# Patient Record
(demographics unavailable — no encounter records)

---

## 2025-06-23 NOTE — DISCUSSION/SUMMARY
[de-identified] : Recent lumbar MRI does not support idea well of LT anterior thigh pain, stenosis is mild at L5-S1, which could be a contributor to her back pains. Bone marrow enhancement from 2020 study appears resolved.  Seen by vascular physician, doppler results pending. Recommended following up with [facility] neurologist in regard to her LT anterior thigh pains.    Moving forward I'd like to see as needed.  Prior to appointment and during encounter with patient extensive medical records were reviewed including but not limited to, hospital records, out patient records, imaging results, and lab data. During this appointment the patient was examined, diagnoses were discussed and explained in a face to face manner. In addition extensive time was spent reviewing aforementioned diagnostic studies. Counseling including abnormal image results, differential diagnoses, treatment options, risk and benefits, lifestyle changes, current condition, and current medications was performed. Patient's comments, questions, and concerns were address and patient verbalized understanding. Based on this patient's presentation at our office, which is an orthopedic spine surgeon's office, this patient inherently / intrinsically has a risk, however minute, of developing issues such as Cauda equina syndrome, bowel and bladder changes, or progression of motor or neurological deficits such as paralysis which may be permanent.   I, Sue Davison, attest that this documentation has been prepared under the direction and in the presence of provider Brody Levi MD.

## 2025-06-23 NOTE — DATA REVIEWED
[FreeTextEntry1] : On my interpretation of these images from [facility] on 06/19/2025.  Report from prior MRI at [facility] 08/20/2020, superior L3, L2-L3 vertebral bodies, possible hemangiomas. Final reading not yet present. Axial slices from L4-S1 not present at the moment. Both L2 and L3 bones do not demonstrate any lesions, no enhancement with addition of contrast.  L5-S1: adv DDD, mild FS BL.  L4-5: mild DDD, disc bulge.  L3-4: mild DDD, disc bulge.  L2-3: nl L1-2: nl T12-L1: nl  On my interpretation of these images. [facility] lumbar MRI- Anterior L2 & posterior portion of L3 small enhancement of bone marrow now resolved on current studies.   Report ONLY on 4/21/25 from [facility]-  Lumbar XR- adv DDD L5-S1, multilevel facet degeneration in other levels & mild SIJ degeneration.  Hip & Pelvis XR- no FXs, mild BL hip OA.  8/20/2020 Lumbar MRI- T11 schmorl's node, degeneration L5-S1 w/ modic 2 changes,  L2 & L3 lesion likely represents atypical hemangioma.

## 2025-06-23 NOTE — HISTORY OF PRESENT ILLNESS
[Lower back] : lower back [de-identified] : 06/23/2025: Patient presenting today for an MRI results review. C/o constant leg pains. L anterior thigh pains w/ burning sensation.    06/16/2025:  Patient presents to the office today for initial evaluation of left thigh pain and numbness. Patient reports 1.5 week history of spontaneous onset of left thigh Burning pain and numbness. No recent trauma injury. She does have a distant history of lumbar spine fracture approximately 45 years ago treated conservatively with a brace. She has had a baseline level of lower back pain and achiness in the legs for many years. She has seen Dr. Sue at [facility] for routine trigger point injections. Last trigger point injection February 2025. Symptoms are predominantly isolated to the left thigh burning numbness. No significant weakness in the lower extremity. No changes in bowel or bladder function. She occasionally reports some pain into the groin.  Past medical history seen for Hashimoto, thyroiditis, seasonal allergies, and vascular disease   The patient is a 74 year female who presents today complaining of low back pain Date of Injury/Onset:  Chronic, worsened over last few weeks Pain:    At Rest: 5/10  With Activity:  8/10  Mechanism of injury: NKI Quality of symptoms:  numbness, burning in left upper thigh, achy, radiating Improves with: sitting Worse with: activity Prior treatment: trigger point injection February 2025 Prior Imaging: [facility] Mri Lumbar Spine 4/21/25 Additional Information:          [] : no [de-identified] : MRI lumbar [facility]

## 2025-06-23 NOTE — PHYSICAL EXAM
[de-identified] : Constitutional: - General Appearance: Unremarkable Body Habitus Well Developed Well Nourished Body Habitus No Deformities Well Groomed Ability To communicate: Normal Neurologic: Global sensation is intact to upper and lower extremities. See examination of Neck and/or Spine for exceptions. Orientation to Time, Place and Person is: Normal Mood And Affect is Normal Skin: - Head/Face, Right Upper/Lower Extremity, Left Upper/Lower Extremity: Normal See Examination of Neck and/or Spine for exceptions Cardiovascular: Peripheral Cardiovascular System is Normal Palpation of Lymph Nodes: Normal Palpation of lymph nodes in: Axilla, Cervical, Inguinal Abnormal Palpation of lymph nodes in: None  [FreeTextEntry3] : Exam was positive for dense numbness, lateral aspect left thigh. SONNY on the left, resulted in left leg and left side of lower back pain Otherwise, normal exam